# Patient Record
Sex: MALE | Race: WHITE | NOT HISPANIC OR LATINO | URBAN - METROPOLITAN AREA
[De-identification: names, ages, dates, MRNs, and addresses within clinical notes are randomized per-mention and may not be internally consistent; named-entity substitution may affect disease eponyms.]

---

## 2024-11-26 ENCOUNTER — OFFICE VISIT (OUTPATIENT)
Dept: URGENT CARE | Age: 24
End: 2024-11-26
Payer: COMMERCIAL

## 2024-11-26 VITALS
WEIGHT: 131 LBS | SYSTOLIC BLOOD PRESSURE: 110 MMHG | OXYGEN SATURATION: 99 % | DIASTOLIC BLOOD PRESSURE: 75 MMHG | RESPIRATION RATE: 18 BRPM | HEART RATE: 104 BPM | TEMPERATURE: 98.2 F

## 2024-11-26 DIAGNOSIS — K29.00 ACUTE GASTRITIS WITHOUT HEMORRHAGE, UNSPECIFIED GASTRITIS TYPE: Primary | ICD-10-CM

## 2024-11-26 PROCEDURE — 99203 OFFICE O/P NEW LOW 30 MIN: CPT | Performed by: EMERGENCY MEDICINE

## 2024-11-26 ASSESSMENT — ENCOUNTER SYMPTOMS: ABDOMINAL PAIN: 1

## 2024-11-26 NOTE — PROGRESS NOTES
Subjective   Patient ID: Brayan Mckeon is a 24 y.o. male. They present today with a chief complaint of Abdominal Pain (LEFT SIDE WORSE YESTERDAY / TODAY JUST BOTHERING HIM A LITTLE ).    History of Present Illness    Abdominal Pain      This is a 24-year-old male who presents today complaining of left upper quadrant discomfort onset 48 hours ago after having a heavy meal.  Patient states he missed work yesterday but his symptoms are improved today however still slightly persistent.  He denies any fever or chills.  Denies any radiation of his pain. he denies any nausea or vomiting.  He denies any hematochezia or any melanotic stool.  Past Medical History  Allergies as of 11/26/2024    (No Known Allergies)       (Not in a hospital admission)       History reviewed. No pertinent past medical history.    History reviewed. No pertinent surgical history.     reports that he does not use drugs.    Review of Systems  Review of Systems   Gastrointestinal:  Positive for abdominal pain.                                  Objective    Vitals:    11/26/24 1249   BP: 110/75   Pulse: 104   Resp: 18   Temp: 36.8 °C (98.2 °F)   TempSrc: Oral   SpO2: 99%   Weight: 59.4 kg (131 lb)     No LMP for male patient.    Physical Exam  Patient is awake alert oriented x 3 in no acute distress vital signs are stable.  Abdominal examination reveals no abdominal tenderness.  No is no guarding or rigidity no rebound.  Hyperactive bowel sounds x 4.  Negative McBurney sign.  Cardiovascular is regular rate and rhythm.  Lungs are clear throughout.  Procedures    Point of Care Test & Imaging Results from this visit  No results found for this visit on 11/26/24.   No results found.    Diagnostic study results (if any) were reviewed by Emil De La Rosa DO.    Assessment/Plan   Allergies, medications, history, and pertinent labs/EKGs/Imaging reviewed by Emil De La Rosa DO.     Medical Decision Making      Orders and Diagnoses  Diagnoses and all  orders for this visit:  Acute gastritis without hemorrhage, unspecified gastritis type      Medical Admin Record      Patient disposition: Home    Electronically signed by Emil De La Rosa DO  3:06 PM